# Patient Record
Sex: MALE | Race: BLACK OR AFRICAN AMERICAN
[De-identification: names, ages, dates, MRNs, and addresses within clinical notes are randomized per-mention and may not be internally consistent; named-entity substitution may affect disease eponyms.]

---

## 2019-08-22 ENCOUNTER — HOSPITAL ENCOUNTER (EMERGENCY)
Dept: HOSPITAL 72 - EMR | Age: 24
Discharge: HOME | End: 2019-08-22
Payer: MEDICAID

## 2019-08-22 VITALS — DIASTOLIC BLOOD PRESSURE: 68 MMHG | SYSTOLIC BLOOD PRESSURE: 120 MMHG

## 2019-08-22 VITALS — DIASTOLIC BLOOD PRESSURE: 73 MMHG | SYSTOLIC BLOOD PRESSURE: 119 MMHG

## 2019-08-22 VITALS — WEIGHT: 165 LBS | BODY MASS INDEX: 23.1 KG/M2 | HEIGHT: 71 IN

## 2019-08-22 DIAGNOSIS — F12.10: ICD-10-CM
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
## 2019-08-22 DIAGNOSIS — F41.9: ICD-10-CM

## 2019-08-22 DIAGNOSIS — R55: Primary | ICD-10-CM

## 2019-08-22 DIAGNOSIS — B20: ICD-10-CM

## 2019-08-22 DIAGNOSIS — T50.905A: ICD-10-CM

## 2019-08-22 DIAGNOSIS — R00.0: ICD-10-CM

## 2019-08-22 LAB
ADD MANUAL DIFF: NO
ALBUMIN SERPL-MCNC: 4.4 G/DL (ref 3.4–5)
ALBUMIN/GLOB SERPL: 1.2 {RATIO} (ref 1–2.7)
ALP SERPL-CCNC: 99 U/L (ref 46–116)
ALT SERPL-CCNC: 22 U/L (ref 12–78)
ANION GAP SERPL CALC-SCNC: 13 MMOL/L (ref 5–15)
AST SERPL-CCNC: 20 U/L (ref 15–37)
BASOPHILS NFR BLD AUTO: 1.1 % (ref 0–2)
BILIRUB SERPL-MCNC: 0.6 MG/DL (ref 0.2–1)
BUN SERPL-MCNC: 11 MG/DL (ref 7–18)
CALCIUM SERPL-MCNC: 8.9 MG/DL (ref 8.5–10.1)
CHLORIDE SERPL-SCNC: 101 MMOL/L (ref 98–107)
CO2 SERPL-SCNC: 27 MMOL/L (ref 21–32)
CREAT SERPL-MCNC: 1.4 MG/DL (ref 0.55–1.3)
EOSINOPHIL NFR BLD AUTO: 0.8 % (ref 0–3)
ERYTHROCYTE [DISTWIDTH] IN BLOOD BY AUTOMATED COUNT: 11.8 % (ref 11.6–14.8)
GLOBULIN SER-MCNC: 3.8 G/DL
HCT VFR BLD CALC: 43.9 % (ref 42–52)
HGB BLD-MCNC: 15.1 G/DL (ref 14.2–18)
LYMPHOCYTES NFR BLD AUTO: 32.1 % (ref 20–45)
MCV RBC AUTO: 87 FL (ref 80–99)
MONOCYTES NFR BLD AUTO: 7.3 % (ref 1–10)
NEUTROPHILS NFR BLD AUTO: 58.8 % (ref 45–75)
PLATELET # BLD: 227 K/UL (ref 150–450)
POTASSIUM SERPL-SCNC: 3.1 MMOL/L (ref 3.5–5.1)
RBC # BLD AUTO: 5.04 M/UL (ref 4.7–6.1)
SODIUM SERPL-SCNC: 141 MMOL/L (ref 136–145)
WBC # BLD AUTO: 5.5 K/UL (ref 4.8–10.8)

## 2019-08-22 PROCEDURE — 85025 COMPLETE CBC W/AUTO DIFF WBC: CPT

## 2019-08-22 PROCEDURE — 36415 COLL VENOUS BLD VENIPUNCTURE: CPT

## 2019-08-22 PROCEDURE — 80307 DRUG TEST PRSMV CHEM ANLYZR: CPT

## 2019-08-22 PROCEDURE — 99284 EMERGENCY DEPT VISIT MOD MDM: CPT

## 2019-08-22 PROCEDURE — 96361 HYDRATE IV INFUSION ADD-ON: CPT

## 2019-08-22 PROCEDURE — 71045 X-RAY EXAM CHEST 1 VIEW: CPT

## 2019-08-22 PROCEDURE — 96374 THER/PROPH/DIAG INJ IV PUSH: CPT

## 2019-08-22 PROCEDURE — 93005 ELECTROCARDIOGRAM TRACING: CPT

## 2019-08-22 PROCEDURE — 80053 COMPREHEN METABOLIC PANEL: CPT

## 2019-08-22 NOTE — NUR
ED Nurse Note:

Patient is distraught, constantly mumbling about family problems. Patient 
expresses no intent to harm himself or others. Patient is accompanied by a 
family member at bedside. Patient tolerated medication well.

## 2019-08-22 NOTE — EMERGENCY ROOM REPORT
History of Present Illness


General


Chief Complaint:  General Complaint


Source:  Patient





Present Illness


HPI


23-year-old male history of bipolar depression history of anxiety presents with 

acute syncopal episode prior to arrival, aggravated by stress, alleviated by no 

stress, severity was mild, patient states that he was smoking weed prior to the 

event, he states when he is in family events he gets stress he states he denies 

any chest pain or shortness of breath he stated that he felt faintish, 

lightheaded, he states this is happened before, he states every time he gets 

together with his family just cannot be comfortable.


Allergies:  


Coded Allergies:  


     No Known Allergies (Unverified , 8/22/19)





Patient History


Past Medical History:  see triage record


Social History:  Reports: drug use - Marijuana


Reviewed Nursing Documentation:  PMH: Agreed; PSxH: Agreed





Nursing Documentation-PMH


Past Medical History:  No History, Except For


Hx Cardiac Problems:  No - HIV





Review of Systems


All Other Systems:  negative except mentioned in HPI





Physical Exam





Vital Signs








  Date Time  Temp Pulse Resp B/P (MAP) Pulse Ox O2 Delivery O2 Flow Rate FiO2


 


8/22/19 20:14 98.8 98 18 119/73 (88) 98 Room Air  








Sp02 EP Interpretation:  reviewed, normal


General Appearance:  well appearing, no apparent distress, alert


Head:  normocephalic, atraumatic, other - Actively crying


Eyes:  bilateral eye PERRL, bilateral eye EOMI


ENT:  uvula midline, moist mucus membranes


Neck:  supple, thyroid normal, supple/symm/no masses


Respiratory:  lungs clear, no respiratory distress, no retraction, no accessory 

muscle use


Cardiovascular #1:  normal peripheral pulses, no edema, no gallop, no murmur, 

tachycardia


Gastrointestinal:  non tender, soft, no guarding, no rebound


Musculoskeletal:  normal inspection


Neurologic:  alert, oriented x3


Psychiatric:  no suicidal/homicidal ideation, anxious


Skin:  no rash, warm/dry





Medical Decision Making


Diagnostic Impression:  


 Primary Impression:  


 Adverse drug effect


 Qualified Codes:  T50.905A - Adverse effect of unspecified drugs, medicaments 

and biological substances, initial encounter


 Additional Impressions:  


 Marijuana use


 Syncope


 Qualified Codes:  R55 - Syncope and collapse


ER Course


23-year-old male no SI HI presents with acute syncopal episode that has since 

resolved lasting a few mins, patient feels better with Ativan, tachycardia has 

resolved.  Differential diagnosis includes anxiety disorder, panic attack, 

vasovagal syncope,  ACS


Fluid rehydration given. 


EKG negative chest x-ray negative, labs negative


Patient feels better.


Dispo home w/ return precautions





Laboratory Tests








Test


  8/22/19


20:21 8/22/19


20:28


 


Urine Opiates Screen


  Negative


(NEGATIVE) 


 


 


Urine Barbiturates Screen


  Negative


(NEGATIVE) 


 


 


Phencyclidine (PCP) Screen


  Negative


(NEGATIVE) 


 


 


Urine Amphetamines Screen


  Negative


(NEGATIVE) 


 


 


Urine Benzodiazepines Screen


  Negative


(NEGATIVE) 


 


 


Urine Cocaine Screen


  Negative


(NEGATIVE) 


 


 


Urine Marijuana (THC) Screen


  Positive


(NEGATIVE)  H 


 


 


White Blood Count


  


  5.5 K/UL


(4.8-10.8)


 


Red Blood Count


  


  5.04 M/UL


(4.70-6.10)


 


Hemoglobin


  


  15.1 G/DL


(14.2-18.0)


 


Hematocrit


  


  43.9 %


(42.0-52.0)


 


Mean Corpuscular Volume  87 FL (80-99)  


 


Mean Corpuscular Hemoglobin


  


  29.9 PG


(27.0-31.0)


 


Mean Corpuscular Hemoglobin


Concent 


  34.3 G/DL


(32.0-36.0)


 


Red Cell Distribution Width


  


  11.8 %


(11.6-14.8)


 


Platelet Count


  


  227 K/UL


(150-450)


 


Mean Platelet Volume


  


  7.5 FL


(6.5-10.1)


 


Neutrophils (%) (Auto)


  


  58.8 %


(45.0-75.0)


 


Lymphocytes (%) (Auto)


  


  32.1 %


(20.0-45.0)


 


Monocytes (%) (Auto)


  


  7.3 %


(1.0-10.0)


 


Eosinophils (%) (Auto)


  


  0.8 %


(0.0-3.0)


 


Basophils (%) (Auto)


  


  1.1 %


(0.0-2.0)


 


Sodium Level


  


  141 MMOL/L


(136-145)


 


Potassium Level


  


  3.1 MMOL/L


(3.5-5.1)  L


 


Chloride Level


  


  101 MMOL/L


()


 


Carbon Dioxide Level


  


  27 MMOL/L


(21-32)


 


Anion Gap


  


  13 mmol/L


(5-15)


 


Blood Urea Nitrogen


  


  11 mg/dL


(7-18)


 


Creatinine


  


  1.4 MG/DL


(0.55-1.30)  H


 


Estimate Glomerular


Filtration Rate 


  > 60 mL/min


(>60)


 


Glucose Level


  


  112 MG/DL


()  H


 


Calcium Level


  


  8.9 MG/DL


(8.5-10.1)


 


Total Bilirubin


  


  0.6 MG/DL


(0.2-1.0)


 


Aspartate Amino Transferase


(AST) 


  20 U/L (15-37)


 


 


Alanine Aminotransferase (ALT)


  


  22 U/L (12-78)


 


 


Alkaline Phosphatase


  


  99 U/L


()


 


Total Protein


  


  8.2 G/DL


(6.4-8.2)


 


Albumin


  


  4.4 G/DL


(3.4-5.0)


 


Globulin  3.8 g/dL  


 


Albumin/Globulin Ratio  1.2 (1.0-2.7)  








EKG Diagnostic Results


EKG Time:  20:58


EP Interpretation:  NSR rate 88 qtc 435, no acute st elevations, normal axis


Rate:  normal


Rhythm:  NSR


ST Segments:  no acute changes





Chest X-Ray Diagnostic Results


Chest X-Ray Diagnostic Results :  


   Chest X-Ray Ordered:  Yes


   # of Views/Limited/Complete:  1 View


   Indication:  Other - preop


   EP Interpretation:  Yes


   Interpretation:  no consolidation, no effusion, no pneumothorax, no acute 

cardiopulmonary disease


   Impression:  No acute disease


   Electronically Signed by:  Ricco Cruz MD





Last Vital Signs








  Date Time  Temp Pulse Resp B/P (MAP) Pulse Ox O2 Delivery O2 Flow Rate FiO2


 


8/22/19 20:14 98.8 98 18 119/73 (88) 98 Room Air  








Disposition:  HOME, SELF-CARE


Condition:  Stable


Referrals[facility]  


[facility]





[facility]


Patient Instructions:  Cannabis Use Disorder, Generalized Anxiety Disorder





Additional Instructions:  


The patient was provided with discharge instructions, notified to follow-up 

with a primary care doctor and or specialist in the next 24-48 hours, and to 

return to the ED if they have worsening of their symptoms. 





Please note that this report is being documented using DRAGON technology.


This can lead to erroneous entry secondary to incorrect interpretation by the 

dictating instrument.











Ricco Cruz MD Aug 22, 2019 20:28

## 2019-08-22 NOTE — NUR
ED Nurse Note:

Patient cleared for discharge, no s/s/ of acute distress. Patient ID band 
removed, IV removed. Patient verbalized understanding of discharge 
instructions. Patient departed with all belongings.

## 2019-08-22 NOTE — NUR
ED Nurse Note:

Patient BIBA from restaurant with onlooker complaints of near syncopal episode. 
ERMD at bedside.

## 2019-08-22 NOTE — NUR
ER DISCHARGE NOTE:

Patient is cleared to be discharged per ERMD, pt is aox4, on room air, with 
stable vital signs. pt was given dc and prescription instructions, pt was able 
to verbalize understanding, pt id band and iv site removed without 
complications. pt is able to ambulate with steady gait mother. pt took all 
belongings.

## 2019-08-25 NOTE — CARDIOLOGY REPORT
--------------- APPROVED REPORT --------------





EKG Measurement

Heart [id number]

TX [id number]

[id number]

[id number]

[id number]





Normal sinus rhythm

Normal ECG hand grasp, leg strength strong and equal bilaterally